# Patient Record
(demographics unavailable — no encounter records)

---

## 2024-10-16 NOTE — PLAN
[FreeTextEntry1] : EXPLAINED LIKELY EARLY ASYMPTOMATIC LSA.  WOULD TREAT CONSISTENTLY AND F/U IN 3 MTHS.  CONTINUE CLOBETASOL BIW UNTIL F/U  Patient screened for depression- no signs of clinical depression.  PHQ-9 scores reviewed over the course of the visit  5-10 minutes of face to face time spent.  Follow up with changes in mood including other symptoms of anxiety.

## 2024-10-16 NOTE — REASON FOR VISIT
[Annual] : an annual visit. [TextEntry] : ANNUAL EXAM.  NO COMPLAINTS.  EARLY LSA NOTED LAST YR ON EXAM-  HAD NO ITCHING.  WAS GIVEN MILD TOPICAL STEROID BUT ONLY USED INCONSISTENTLY

## 2024-10-16 NOTE — PHYSICAL EXAM
[Appropriately responsive] : appropriately responsive [Alert] : alert [No Acute Distress] : no acute distress [No Lymphadenopathy] : no lymphadenopathy [Soft] : soft [Non-tender] : non-tender [Non-distended] : non-distended [No HSM] : No HSM [No Lesions] : no lesions [No Mass] : no mass [Oriented x3] : oriented x3 [Examination Of The Breasts] : a normal appearance [No Masses] : no breast masses were palpable [Labia Majora] : normal [Labia Minora] : normal [Normal] : normal [Uterine Adnexae] : normal [FreeTextEntry1] : SMALL AREA OF WHITE PLAQUE AT SUPERIOR VULVA AND MIDLINE INTROITUS C/W EARLY LSA

## 2024-10-18 NOTE — PHYSICAL EXAM
[No Acute Distress] : no acute distress [Well Nourished] : well nourished [Well Developed] : well developed [Well-Appearing] : well-appearing [PERRL] : pupils equal round and reactive to light [EOMI] : extraocular movements intact [Normal Oropharynx] : the oropharynx was normal [Normal TMs] : both tympanic membranes were normal [No Lymphadenopathy] : no lymphadenopathy [Supple] : supple [Thyroid Normal, No Nodules] : the thyroid was normal and there were no nodules present [No Respiratory Distress] : no respiratory distress  [No Accessory Muscle Use] : no accessory muscle use [Clear to Auscultation] : lungs were clear to auscultation bilaterally [Normal Rate] : normal rate  [Regular Rhythm] : with a regular rhythm [Normal S1, S2] : normal S1 and S2 [No Murmur] : no murmur heard [No Carotid Bruits] : no carotid bruits [Pedal Pulses Present] : the pedal pulses are present [No Edema] : there was no peripheral edema [Normal Appearance] : normal in appearance [No Nipple Discharge] : no nipple discharge [No Axillary Lymphadenopathy] : no axillary lymphadenopathy [Soft] : abdomen soft [Non Tender] : non-tender [Non-distended] : non-distended [No Masses] : no abdominal mass palpated [No HSM] : no HSM [Normal Bowel Sounds] : normal bowel sounds [Normal Supraclavicular Nodes] : no supraclavicular lymphadenopathy [Normal Axillary Nodes] : no axillary lymphadenopathy [Normal Posterior Cervical Nodes] : no posterior cervical lymphadenopathy [Normal Anterior Cervical Nodes] : no anterior cervical lymphadenopathy [Normal Inguinal Nodes] : no inguinal lymphadenopathy [No Joint Swelling] : no joint swelling [No Rash] : no rash [Normal Gait] : normal gait [Normal Affect] : the affect was normal [de-identified] : 2 cm ovoid subcutaneous mass right mid-abdomen (reports unchanged from prior, c/w lipoma on US) [de-identified] : no visible areas of alopecia

## 2024-10-18 NOTE — HEALTH RISK ASSESSMENT
[Patient reported mammogram was normal] : Patient reported mammogram was normal [Patient reported PAP Smear was normal] : Patient reported PAP Smear was normal [Patient reported colonoscopy was normal] : Patient reported colonoscopy was normal [Yes] : Yes [2 - 4 times a month (2 pts)] : 2-4 times a month (2 points) [1 or 2 (0 pts)] : 1 or 2 (0 points) [Never (0 pts)] : Never (0 points) [No] : In the past 12 months have you used drugs other than those required for medical reasons? No [0] : 2) Feeling down, depressed, or hopeless: Not at all (0) [PHQ-2 Negative - No further assessment needed] : PHQ-2 Negative - No further assessment needed [Never] : Never [With Family] : lives with family [Unemployed] : unemployed [] :  [MammogramDate] : 10/23 [MammogramComments] : will get rx mammo [PapSmearDate] : 09/22 [PapSmearComments] : awaiting today [ColonoscopyDate] : 05/22 [ColonoscopyComments] : Dr. Serrano [de-identified] : , son, has other grown children

## 2024-10-18 NOTE — PHYSICAL EXAM
[No Acute Distress] : no acute distress [Well Nourished] : well nourished [Well Developed] : well developed [Well-Appearing] : well-appearing [PERRL] : pupils equal round and reactive to light [EOMI] : extraocular movements intact [Normal Oropharynx] : the oropharynx was normal [Normal TMs] : both tympanic membranes were normal [No Lymphadenopathy] : no lymphadenopathy [Supple] : supple [Thyroid Normal, No Nodules] : the thyroid was normal and there were no nodules present [No Respiratory Distress] : no respiratory distress  [No Accessory Muscle Use] : no accessory muscle use [Clear to Auscultation] : lungs were clear to auscultation bilaterally [Normal Rate] : normal rate  [Regular Rhythm] : with a regular rhythm [Normal S1, S2] : normal S1 and S2 [No Murmur] : no murmur heard [No Carotid Bruits] : no carotid bruits [Pedal Pulses Present] : the pedal pulses are present [No Edema] : there was no peripheral edema [Normal Appearance] : normal in appearance [No Nipple Discharge] : no nipple discharge [No Axillary Lymphadenopathy] : no axillary lymphadenopathy [Soft] : abdomen soft [Non Tender] : non-tender [Non-distended] : non-distended [No Masses] : no abdominal mass palpated [No HSM] : no HSM [Normal Bowel Sounds] : normal bowel sounds [Normal Supraclavicular Nodes] : no supraclavicular lymphadenopathy [Normal Axillary Nodes] : no axillary lymphadenopathy [Normal Posterior Cervical Nodes] : no posterior cervical lymphadenopathy [Normal Anterior Cervical Nodes] : no anterior cervical lymphadenopathy [Normal Inguinal Nodes] : no inguinal lymphadenopathy [No Joint Swelling] : no joint swelling [No Rash] : no rash [Normal Gait] : normal gait [Normal Affect] : the affect was normal [de-identified] : 2 cm ovoid subcutaneous mass right mid-abdomen (reports unchanged from prior, c/w lipoma on US) [de-identified] : no visible areas of alopecia

## 2024-10-18 NOTE — HISTORY OF PRESENT ILLNESS
[FreeTextEntry1] : physical [de-identified] : 59 yo female with h/o as below here for CPE. Has grandson, daughter got , another son getting . Feeling well overall, no complaints.  Exercising regularly, watching diet. Did have area of scalp where lost hair but now growing hair back.

## 2024-10-18 NOTE — ASSESSMENT
[FreeTextEntry1] : 59 yo female with h/o as above including hyperlipidemia, subclinical hypothyroidism here for CPE. 1.  CV - bp at goal, check lipids 2.  Endo - check tfts for subclinical hypothyroidism hx, check a1c and vitamin d 3.  Gyn - following with gyn and pap utd, awaiting mammo 4.  GI - colonoscopy utd 5.  HCM - check other routine labs; tdap today, consider flu, covid, and shingrix vaccines 6.  RTO prn or 1 year

## 2024-10-18 NOTE — HISTORY OF PRESENT ILLNESS
[FreeTextEntry1] : physical [de-identified] : 57 yo female with h/o as below here for CPE. Has grandson, daughter got , another son getting . Feeling well overall, no complaints.  Exercising regularly, watching diet. Did have area of scalp where lost hair but now growing hair back.

## 2024-10-18 NOTE — HEALTH RISK ASSESSMENT
[Patient reported mammogram was normal] : Patient reported mammogram was normal [Patient reported PAP Smear was normal] : Patient reported PAP Smear was normal [Patient reported colonoscopy was normal] : Patient reported colonoscopy was normal [Yes] : Yes [2 - 4 times a month (2 pts)] : 2-4 times a month (2 points) [1 or 2 (0 pts)] : 1 or 2 (0 points) [Never (0 pts)] : Never (0 points) [No] : In the past 12 months have you used drugs other than those required for medical reasons? No [0] : 2) Feeling down, depressed, or hopeless: Not at all (0) [PHQ-2 Negative - No further assessment needed] : PHQ-2 Negative - No further assessment needed [Never] : Never [With Family] : lives with family [Unemployed] : unemployed [] :  [MammogramDate] : 10/23 [MammogramComments] : will get rx mammo [PapSmearDate] : 09/22 [PapSmearComments] : awaiting today [ColonoscopyDate] : 05/22 [ColonoscopyComments] : Dr. Serrano [de-identified] : , son, has other grown children

## 2024-10-18 NOTE — PAST MEDICAL HISTORY
[Postmenopausal] : postmenopausal [Menopause Age____] : age at menopause was [unfilled] [Total Preg ___] : G[unfilled] [Full Term ___] : Full Term: [unfilled] [AB Induced ___] : elective abortions: [unfilled]  [Ectopics ___] : ectopic pregnancies: [unfilled]  [FreeTextEntry1] : no vaginal bleeding, no uterine or ovarian abnl, no h/o abnl paps, no h/o STDs, doesn't believe at risk for STIs, sexually active with  only

## 2024-10-18 NOTE — REVIEW OF SYSTEMS
[Negative] : Musculoskeletal [Fever] : no fever [Chills] : no chills [Fatigue] : no fatigue [Recent Change In Weight] : ~T no recent weight change [Chest Pain] : no chest pain [Palpitations] : no palpitations [Shortness Of Breath] : no shortness of breath [Cough] : no cough [Dyspnea on Exertion] : no dyspnea on exertion [Abdominal Pain] : no abdominal pain [Nausea] : no nausea [Constipation] : no constipation [Diarrhea] : diarrhea [Vomiting] : no vomiting [Heartburn] : no heartburn [Melena] : no melena [Dysuria] : no dysuria [Vaginal Discharge] : no vaginal discharge [Skin Rash] : no skin rash [Headache] : no headache [Dizziness] : no dizziness [Fainting] : no fainting [Anxiety] : no anxiety [Depression] : no depression [Easy Bleeding] : no easy bleeding [Easy Bruising] : no easy bruising

## 2024-10-18 NOTE — ASSESSMENT
[FreeTextEntry1] : 57 yo female with h/o as above including hyperlipidemia, subclinical hypothyroidism here for CPE. 1.  CV - bp at goal, check lipids 2.  Endo - check tfts for subclinical hypothyroidism hx, check a1c and vitamin d 3.  Gyn - following with gyn and pap utd, awaiting mammo 4.  GI - colonoscopy utd 5.  HCM - check other routine labs; tdap today, consider flu, covid, and shingrix vaccines 6.  RTO prn or 1 year